# Patient Record
Sex: FEMALE | Race: BLACK OR AFRICAN AMERICAN | Employment: OTHER | URBAN - NONMETROPOLITAN AREA
[De-identification: names, ages, dates, MRNs, and addresses within clinical notes are randomized per-mention and may not be internally consistent; named-entity substitution may affect disease eponyms.]

---

## 2017-01-01 ENCOUNTER — APPOINTMENT (OUTPATIENT)
Dept: GENERAL RADIOLOGY | Age: 82
DRG: 870 | End: 2017-01-01
Payer: MEDICARE

## 2017-01-01 ENCOUNTER — HOSPITAL ENCOUNTER (INPATIENT)
Age: 82
LOS: 4 days | Discharge: HOSPICE | DRG: 870 | End: 2017-11-01
Attending: FAMILY MEDICINE | Admitting: INTERNAL MEDICINE
Payer: MEDICARE

## 2017-01-01 ENCOUNTER — APPOINTMENT (OUTPATIENT)
Dept: CT IMAGING | Age: 82
DRG: 870 | End: 2017-01-01
Payer: MEDICARE

## 2017-01-01 VITALS
DIASTOLIC BLOOD PRESSURE: 57 MMHG | BODY MASS INDEX: 23.32 KG/M2 | HEIGHT: 67 IN | TEMPERATURE: 98.8 F | HEART RATE: 86 BPM | OXYGEN SATURATION: 95 % | RESPIRATION RATE: 14 BRPM | SYSTOLIC BLOOD PRESSURE: 106 MMHG | WEIGHT: 148.6 LBS

## 2017-01-01 DIAGNOSIS — N17.9 ACUTE RENAL FAILURE, UNSPECIFIED ACUTE RENAL FAILURE TYPE (HCC): ICD-10-CM

## 2017-01-01 DIAGNOSIS — N39.0 BACTERIAL UTI: ICD-10-CM

## 2017-01-01 DIAGNOSIS — R77.8 ELEVATED TROPONIN: ICD-10-CM

## 2017-01-01 DIAGNOSIS — A49.9 BACTERIAL UTI: ICD-10-CM

## 2017-01-01 DIAGNOSIS — A41.9 SEPSIS, DUE TO UNSPECIFIED ORGANISM: Primary | ICD-10-CM

## 2017-01-01 DIAGNOSIS — J18.9 PNEUMONIA DUE TO ORGANISM: ICD-10-CM

## 2017-01-01 LAB
ALBUMIN SERPL-MCNC: 1.8 G/DL (ref 3.5–5.2)
ALBUMIN SERPL-MCNC: 3.5 G/DL (ref 3.5–5.2)
ALP BLD-CCNC: 106 U/L (ref 35–104)
ALP BLD-CCNC: 60 U/L (ref 35–104)
ALT SERPL-CCNC: 15 U/L (ref 5–33)
ALT SERPL-CCNC: 9 U/L (ref 5–33)
AMORPHOUS: ABNORMAL /HPF
ANION GAP SERPL CALCULATED.3IONS-SCNC: 11 MMOL/L (ref 7–19)
ANION GAP SERPL CALCULATED.3IONS-SCNC: 7 MMOL/L (ref 7–19)
ANION GAP SERPL CALCULATED.3IONS-SCNC: 8 MMOL/L (ref 7–19)
APTT: 31.8 SEC (ref 26–36.2)
AST SERPL-CCNC: 18 U/L (ref 5–32)
AST SERPL-CCNC: 22 U/L (ref 5–32)
BACTERIA: ABNORMAL /HPF
BASE EXCESS ARTERIAL: 10.4 MMOL/L (ref -2–2)
BASE EXCESS ARTERIAL: 2.4 MMOL/L (ref -2–2)
BASE EXCESS ARTERIAL: 3 MMOL/L (ref -2–2)
BASE EXCESS ARTERIAL: 3.5 MMOL/L (ref -2–2)
BASE EXCESS ARTERIAL: 5 MMOL/L (ref -2–2)
BASE EXCESS ARTERIAL: 7.7 MMOL/L (ref -2–2)
BASOPHILS ABSOLUTE: 0 K/UL (ref 0–0.2)
BASOPHILS RELATIVE PERCENT: 0.1 % (ref 0–1)
BASOPHILS RELATIVE PERCENT: 0.2 % (ref 0–1)
BASOPHILS RELATIVE PERCENT: 0.4 % (ref 0–1)
BILIRUB SERPL-MCNC: 0.4 MG/DL (ref 0.2–1.2)
BILIRUB SERPL-MCNC: 0.4 MG/DL (ref 0.2–1.2)
BILIRUBIN URINE: ABNORMAL
BLOOD CULTURE, ROUTINE: NORMAL
BLOOD, URINE: ABNORMAL
BUN BLDV-MCNC: 23 MG/DL (ref 8–23)
BUN BLDV-MCNC: 37 MG/DL (ref 8–23)
BUN BLDV-MCNC: 51 MG/DL (ref 8–23)
BUN BLDV-MCNC: 58 MG/DL (ref 8–23)
BUN BLDV-MCNC: 60 MG/DL (ref 8–23)
C-REACTIVE PROTEIN: 13.24 MG/DL (ref 0–0.5)
C-REACTIVE PROTEIN: 15.26 MG/DL (ref 0–0.5)
CALCIUM SERPL-MCNC: 7.6 MG/DL (ref 8.8–10.2)
CALCIUM SERPL-MCNC: 7.9 MG/DL (ref 8.8–10.2)
CALCIUM SERPL-MCNC: 8.2 MG/DL (ref 8.8–10.2)
CALCIUM SERPL-MCNC: 8.3 MG/DL (ref 8.8–10.2)
CALCIUM SERPL-MCNC: 8.7 MG/DL (ref 8.8–10.2)
CARBOXYHEMOGLOBIN ARTERIAL: 1.8 % (ref 0–5)
CARBOXYHEMOGLOBIN ARTERIAL: 2 % (ref 0–5)
CARBOXYHEMOGLOBIN ARTERIAL: 2.1 % (ref 0–5)
CARBOXYHEMOGLOBIN ARTERIAL: 2.2 % (ref 0–5)
CARBOXYHEMOGLOBIN ARTERIAL: 2.6 % (ref 0–5)
CARBOXYHEMOGLOBIN ARTERIAL: 2.7 % (ref 0–5)
CHLORIDE BLD-SCNC: 100 MMOL/L (ref 98–111)
CHLORIDE BLD-SCNC: 104 MMOL/L (ref 98–111)
CHLORIDE BLD-SCNC: 108 MMOL/L (ref 98–111)
CHLORIDE BLD-SCNC: 109 MMOL/L (ref 98–111)
CHLORIDE BLD-SCNC: 115 MMOL/L (ref 98–111)
CLARITY: ABNORMAL
CO2: 27 MMOL/L (ref 22–29)
CO2: 29 MMOL/L (ref 22–29)
CO2: 29 MMOL/L (ref 22–29)
CO2: 32 MMOL/L (ref 22–29)
CO2: 34 MMOL/L (ref 22–29)
COLOR: YELLOW
CREAT SERPL-MCNC: 1.2 MG/DL (ref 0.5–0.9)
CREAT SERPL-MCNC: 1.5 MG/DL (ref 0.5–0.9)
CREAT SERPL-MCNC: 1.8 MG/DL (ref 0.5–0.9)
CREAT SERPL-MCNC: 2.7 MG/DL (ref 0.5–0.9)
CREAT SERPL-MCNC: 3 MG/DL (ref 0.5–0.9)
CULTURE, BLOOD 2: NORMAL
EOSINOPHILS ABSOLUTE: 0 K/UL (ref 0–0.6)
EOSINOPHILS ABSOLUTE: 0.1 K/UL (ref 0–0.6)
EOSINOPHILS ABSOLUTE: 0.1 K/UL (ref 0–0.6)
EOSINOPHILS RELATIVE PERCENT: 0 % (ref 0–5)
EOSINOPHILS RELATIVE PERCENT: 0.5 % (ref 0–5)
EOSINOPHILS RELATIVE PERCENT: 1.1 % (ref 0–5)
EPITHELIAL CELLS, UA: ABNORMAL /HPF
GFR NON-AFRICAN AMERICAN: 15
GFR NON-AFRICAN AMERICAN: 17
GFR NON-AFRICAN AMERICAN: 27
GFR NON-AFRICAN AMERICAN: 33
GFR NON-AFRICAN AMERICAN: 43
GLUCOSE BLD-MCNC: 105 MG/DL (ref 74–109)
GLUCOSE BLD-MCNC: 108 MG/DL (ref 74–109)
GLUCOSE BLD-MCNC: 121 MG/DL (ref 74–109)
GLUCOSE BLD-MCNC: 174 MG/DL
GLUCOSE BLD-MCNC: 174 MG/DL (ref 70–99)
GLUCOSE BLD-MCNC: 231 MG/DL (ref 74–109)
GLUCOSE BLD-MCNC: 85 MG/DL (ref 74–109)
GLUCOSE URINE: NEGATIVE MG/DL
HCO3 ARTERIAL: 27 MMOL/L (ref 22–26)
HCO3 ARTERIAL: 27.8 MMOL/L (ref 22–26)
HCO3 ARTERIAL: 27.8 MMOL/L (ref 22–26)
HCO3 ARTERIAL: 30.9 MMOL/L (ref 22–26)
HCO3 ARTERIAL: 32.1 MMOL/L (ref 22–26)
HCO3 ARTERIAL: 34.6 MMOL/L (ref 22–26)
HCT VFR BLD CALC: 31.7 % (ref 37–47)
HCT VFR BLD CALC: 32.9 % (ref 37–47)
HCT VFR BLD CALC: 33.6 % (ref 37–47)
HCT VFR BLD CALC: 48.4 % (ref 37–47)
HEMOGLOBIN, ART, EXTENDED: 10.6 G/DL (ref 12–16)
HEMOGLOBIN, ART, EXTENDED: 10.8 G/DL (ref 12–16)
HEMOGLOBIN, ART, EXTENDED: 11.2 G/DL (ref 12–16)
HEMOGLOBIN, ART, EXTENDED: 11.5 G/DL (ref 12–16)
HEMOGLOBIN, ART, EXTENDED: 12.1 G/DL (ref 12–16)
HEMOGLOBIN, ART, EXTENDED: 12.9 G/DL (ref 12–16)
HEMOGLOBIN: 10.1 G/DL (ref 12–16)
HEMOGLOBIN: 10.4 G/DL (ref 12–16)
HEMOGLOBIN: 14 G/DL (ref 12–16)
HEMOGLOBIN: 9.8 G/DL (ref 12–16)
INR BLD: 1.37 (ref 0.88–1.18)
KETONES, URINE: NEGATIVE MG/DL
LACTIC ACID: 3.4 MMOL/L (ref 0.5–1.9)
LEUKOCYTE ESTERASE, URINE: ABNORMAL
LYMPHOCYTES ABSOLUTE: 0.5 K/UL (ref 1.1–4.5)
LYMPHOCYTES ABSOLUTE: 0.7 K/UL (ref 1.1–4.5)
LYMPHOCYTES ABSOLUTE: 0.8 K/UL (ref 1.1–4.5)
LYMPHOCYTES RELATIVE PERCENT: 5.5 % (ref 20–40)
LYMPHOCYTES RELATIVE PERCENT: 5.6 % (ref 20–40)
LYMPHOCYTES RELATIVE PERCENT: 6.6 % (ref 20–40)
MCH RBC QN AUTO: 26 PG (ref 27–31)
MCH RBC QN AUTO: 26.2 PG (ref 27–31)
MCH RBC QN AUTO: 26.3 PG (ref 27–31)
MCH RBC QN AUTO: 26.8 PG (ref 27–31)
MCHC RBC AUTO-ENTMCNC: 28.9 G/DL (ref 33–37)
MCHC RBC AUTO-ENTMCNC: 30.7 G/DL (ref 33–37)
MCHC RBC AUTO-ENTMCNC: 30.9 G/DL (ref 33–37)
MCHC RBC AUTO-ENTMCNC: 31 G/DL (ref 33–37)
MCV RBC AUTO: 85.1 FL (ref 81–99)
MCV RBC AUTO: 85.5 FL (ref 81–99)
MCV RBC AUTO: 86.8 FL (ref 81–99)
MCV RBC AUTO: 90 FL (ref 81–99)
METHEMOGLOBIN ARTERIAL: 0.8 %
METHEMOGLOBIN ARTERIAL: 0.9 %
METHEMOGLOBIN ARTERIAL: 1 %
METHEMOGLOBIN ARTERIAL: 1.3 %
METHEMOGLOBIN ARTERIAL: 1.4 %
METHEMOGLOBIN ARTERIAL: 1.5 %
MONOCYTES ABSOLUTE: 0.8 K/UL (ref 0–0.9)
MONOCYTES ABSOLUTE: 0.9 K/UL (ref 0–0.9)
MONOCYTES ABSOLUTE: 0.9 K/UL (ref 0–0.9)
MONOCYTES RELATIVE PERCENT: 10.4 % (ref 0–10)
MONOCYTES RELATIVE PERCENT: 5.8 % (ref 0–10)
MONOCYTES RELATIVE PERCENT: 8.7 % (ref 0–10)
NEUTROPHILS ABSOLUTE: 11.9 K/UL (ref 1.5–7.5)
NEUTROPHILS ABSOLUTE: 6.9 K/UL (ref 1.5–7.5)
NEUTROPHILS ABSOLUTE: 8.3 K/UL (ref 1.5–7.5)
NEUTROPHILS RELATIVE PERCENT: 81.8 % (ref 50–65)
NEUTROPHILS RELATIVE PERCENT: 83.5 % (ref 50–65)
NEUTROPHILS RELATIVE PERCENT: 87.9 % (ref 50–65)
NITRITE, URINE: NEGATIVE
O2 CONTENT ARTERIAL: 13.9 ML/DL
O2 CONTENT ARTERIAL: 14.1 ML/DL
O2 CONTENT ARTERIAL: 15.2 ML/DL
O2 CONTENT ARTERIAL: 15.3 ML/DL
O2 CONTENT ARTERIAL: 16.5 ML/DL
O2 CONTENT ARTERIAL: 16.7 ML/DL
O2 SAT, ARTERIAL: 91.8 %
O2 SAT, ARTERIAL: 92.4 %
O2 SAT, ARTERIAL: 92.9 %
O2 SAT, ARTERIAL: 93.5 %
O2 SAT, ARTERIAL: 96 %
O2 SAT, ARTERIAL: 96.2 %
O2 THERAPY: ABNORMAL
ORGANISM: ABNORMAL
PCO2 ARTERIAL: 32 MMHG (ref 35–45)
PCO2 ARTERIAL: 37 MMHG (ref 35–45)
PCO2 ARTERIAL: 38 MMHG (ref 35–45)
PCO2 ARTERIAL: 40 MMHG (ref 35–45)
PCO2 ARTERIAL: 46 MMHG (ref 35–45)
PCO2 ARTERIAL: 77 MMHG (ref 35–45)
PDW BLD-RTO: 15.9 % (ref 11.5–14.5)
PDW BLD-RTO: 15.9 % (ref 11.5–14.5)
PDW BLD-RTO: 16.3 % (ref 11.5–14.5)
PDW BLD-RTO: 16.3 % (ref 11.5–14.5)
PERFORMED ON: ABNORMAL
PERFORMED ON: ABNORMAL
PH ARTERIAL: 7.26 (ref 7.35–7.45)
PH ARTERIAL: 7.39 (ref 7.35–7.45)
PH ARTERIAL: 7.45 (ref 7.35–7.45)
PH ARTERIAL: 7.46 (ref 7.35–7.45)
PH ARTERIAL: 7.53 (ref 7.35–7.45)
PH ARTERIAL: 7.61 (ref 7.35–7.45)
PH UA: 5
PLATELET # BLD: 177 K/UL (ref 130–400)
PLATELET # BLD: 193 K/UL (ref 130–400)
PLATELET # BLD: 204 K/UL (ref 130–400)
PLATELET # BLD: 221 K/UL (ref 130–400)
PMV BLD AUTO: 9 FL (ref 9.4–12.3)
PMV BLD AUTO: 9.6 FL (ref 9.4–12.3)
PMV BLD AUTO: 9.9 FL (ref 9.4–12.3)
PMV BLD AUTO: 9.9 FL (ref 9.4–12.3)
PO2 ARTERIAL: 102 MMHG (ref 80–100)
PO2 ARTERIAL: 108 MMHG (ref 80–100)
PO2 ARTERIAL: 61 MMHG (ref 80–100)
PO2 ARTERIAL: 67 MMHG (ref 80–100)
PO2 ARTERIAL: 70 MMHG (ref 80–100)
PO2 ARTERIAL: 73 MMHG (ref 80–100)
POC TROPONIN I: 0.17 NG/ML (ref 0–0.08)
POTASSIUM SERPL-SCNC: 3.7 MMOL/L (ref 3.5–5)
POTASSIUM SERPL-SCNC: 3.7 MMOL/L (ref 3.5–5)
POTASSIUM SERPL-SCNC: 3.9 MMOL/L (ref 3.5–5)
POTASSIUM SERPL-SCNC: 4.3 MMOL/L (ref 3.5–5)
POTASSIUM SERPL-SCNC: 5 MMOL/L (ref 3.5–5)
POTASSIUM, WHOLE BLOOD: 3.5
POTASSIUM, WHOLE BLOOD: 3.6
POTASSIUM, WHOLE BLOOD: 3.8
POTASSIUM, WHOLE BLOOD: 4.1
POTASSIUM, WHOLE BLOOD: 4.2
POTASSIUM, WHOLE BLOOD: 4.5
PROTEIN UA: 100 MG/DL
PROTHROMBIN TIME: 16.9 SEC (ref 12–14.6)
RBC # BLD: 3.65 M/UL (ref 4.2–5.4)
RBC # BLD: 3.85 M/UL (ref 4.2–5.4)
RBC # BLD: 3.95 M/UL (ref 4.2–5.4)
RBC # BLD: 5.38 M/UL (ref 4.2–5.4)
RBC UA: ABNORMAL /HPF (ref 0–2)
SODIUM BLD-SCNC: 141 MMOL/L (ref 136–145)
SODIUM BLD-SCNC: 145 MMOL/L (ref 136–145)
SODIUM BLD-SCNC: 145 MMOL/L (ref 136–145)
SODIUM BLD-SCNC: 148 MMOL/L (ref 136–145)
SODIUM BLD-SCNC: 150 MMOL/L (ref 136–145)
SPECIFIC GRAVITY UA: >=1.03
TOTAL CK: 203 U/L (ref 26–192)
TOTAL PROTEIN: 5.4 G/DL (ref 6.6–8.7)
TOTAL PROTEIN: 8.3 G/DL (ref 6.6–8.7)
URINE CULTURE, ROUTINE: ABNORMAL
URINE CULTURE, ROUTINE: ABNORMAL
UROBILINOGEN, URINE: 1 E.U./DL
VANCOMYCIN RANDOM: 13.4 UG/ML
VANCOMYCIN RANDOM: 5.2 UG/ML
VANCOMYCIN TROUGH: 11.2 UG/ML (ref 10–20)
WBC # BLD: 12.8 K/UL (ref 4.8–10.8)
WBC # BLD: 13.6 K/UL (ref 4.8–10.8)
WBC # BLD: 8.4 K/UL (ref 4.8–10.8)
WBC # BLD: 9.9 K/UL (ref 4.8–10.8)
WBC UA: ABNORMAL /HPF (ref 0–5)

## 2017-01-01 PROCEDURE — 2500000003 HC RX 250 WO HCPCS: Performed by: FAMILY MEDICINE

## 2017-01-01 PROCEDURE — 6360000002 HC RX W HCPCS: Performed by: INTERNAL MEDICINE

## 2017-01-01 PROCEDURE — 80202 ASSAY OF VANCOMYCIN: CPT

## 2017-01-01 PROCEDURE — 2580000003 HC RX 258: Performed by: INTERNAL MEDICINE

## 2017-01-01 PROCEDURE — 2580000003 HC RX 258: Performed by: FAMILY MEDICINE

## 2017-01-01 PROCEDURE — 2700000000 HC OXYGEN THERAPY PER DAY

## 2017-01-01 PROCEDURE — 6370000000 HC RX 637 (ALT 250 FOR IP): Performed by: INTERNAL MEDICINE

## 2017-01-01 PROCEDURE — C9113 INJ PANTOPRAZOLE SODIUM, VIA: HCPCS | Performed by: INTERNAL MEDICINE

## 2017-01-01 PROCEDURE — 99232 SBSQ HOSP IP/OBS MODERATE 35: CPT | Performed by: INTERNAL MEDICINE

## 2017-01-01 PROCEDURE — 83605 ASSAY OF LACTIC ACID: CPT

## 2017-01-01 PROCEDURE — 36556 INSERT NON-TUNNEL CV CATH: CPT | Performed by: FAMILY MEDICINE

## 2017-01-01 PROCEDURE — 99223 1ST HOSP IP/OBS HIGH 75: CPT | Performed by: INTERNAL MEDICINE

## 2017-01-01 PROCEDURE — 80053 COMPREHEN METABOLIC PANEL: CPT

## 2017-01-01 PROCEDURE — 70450 CT HEAD/BRAIN W/O DYE: CPT

## 2017-01-01 PROCEDURE — 82803 BLOOD GASES ANY COMBINATION: CPT

## 2017-01-01 PROCEDURE — 0BH17EZ INSERTION OF ENDOTRACHEAL AIRWAY INTO TRACHEA, VIA NATURAL OR ARTIFICIAL OPENING: ICD-10-PCS | Performed by: FAMILY MEDICINE

## 2017-01-01 PROCEDURE — 94002 VENT MGMT INPAT INIT DAY: CPT

## 2017-01-01 PROCEDURE — 71010 XR CHEST PORTABLE: CPT

## 2017-01-01 PROCEDURE — 2000000000 HC ICU R&B

## 2017-01-01 PROCEDURE — 84132 ASSAY OF SERUM POTASSIUM: CPT

## 2017-01-01 PROCEDURE — 36415 COLL VENOUS BLD VENIPUNCTURE: CPT

## 2017-01-01 PROCEDURE — 82550 ASSAY OF CK (CPK): CPT

## 2017-01-01 PROCEDURE — 36600 WITHDRAWAL OF ARTERIAL BLOOD: CPT

## 2017-01-01 PROCEDURE — 86140 C-REACTIVE PROTEIN: CPT

## 2017-01-01 PROCEDURE — 81001 URINALYSIS AUTO W/SCOPE: CPT

## 2017-01-01 PROCEDURE — 87040 BLOOD CULTURE FOR BACTERIA: CPT

## 2017-01-01 PROCEDURE — 87185 SC STD ENZYME DETCJ PER NZM: CPT

## 2017-01-01 PROCEDURE — 80048 BASIC METABOLIC PNL TOTAL CA: CPT

## 2017-01-01 PROCEDURE — 87086 URINE CULTURE/COLONY COUNT: CPT

## 2017-01-01 PROCEDURE — 99291 CRITICAL CARE FIRST HOUR: CPT | Performed by: INTERNAL MEDICINE

## 2017-01-01 PROCEDURE — 2580000003 HC RX 258: Performed by: PHYSICIAN ASSISTANT

## 2017-01-01 PROCEDURE — 93005 ELECTROCARDIOGRAM TRACING: CPT

## 2017-01-01 PROCEDURE — 85730 THROMBOPLASTIN TIME PARTIAL: CPT

## 2017-01-01 PROCEDURE — B548ZZA ULTRASONOGRAPHY OF SUPERIOR VENA CAVA, GUIDANCE: ICD-10-PCS | Performed by: FAMILY MEDICINE

## 2017-01-01 PROCEDURE — 02HV33Z INSERTION OF INFUSION DEVICE INTO SUPERIOR VENA CAVA, PERCUTANEOUS APPROACH: ICD-10-PCS | Performed by: FAMILY MEDICINE

## 2017-01-01 PROCEDURE — 5A1955Z RESPIRATORY VENTILATION, GREATER THAN 96 CONSECUTIVE HOURS: ICD-10-PCS | Performed by: INTERNAL MEDICINE

## 2017-01-01 PROCEDURE — 76937 US GUIDE VASCULAR ACCESS: CPT | Performed by: FAMILY MEDICINE

## 2017-01-01 PROCEDURE — 94003 VENT MGMT INPAT SUBQ DAY: CPT

## 2017-01-01 PROCEDURE — 85025 COMPLETE CBC W/AUTO DIFF WBC: CPT

## 2017-01-01 PROCEDURE — 92950 HEART/LUNG RESUSCITATION CPR: CPT

## 2017-01-01 PROCEDURE — 84484 ASSAY OF TROPONIN QUANT: CPT

## 2017-01-01 PROCEDURE — 85027 COMPLETE CBC AUTOMATED: CPT

## 2017-01-01 PROCEDURE — 31500 INSERT EMERGENCY AIRWAY: CPT | Performed by: FAMILY MEDICINE

## 2017-01-01 PROCEDURE — 36592 COLLECT BLOOD FROM PICC: CPT

## 2017-01-01 PROCEDURE — 96374 THER/PROPH/DIAG INJ IV PUSH: CPT

## 2017-01-01 PROCEDURE — 31500 INSERT EMERGENCY AIRWAY: CPT

## 2017-01-01 PROCEDURE — 99285 EMERGENCY DEPT VISIT HI MDM: CPT

## 2017-01-01 PROCEDURE — 82948 REAGENT STRIP/BLOOD GLUCOSE: CPT

## 2017-01-01 PROCEDURE — 85610 PROTHROMBIN TIME: CPT

## 2017-01-01 PROCEDURE — 99291 CRITICAL CARE FIRST HOUR: CPT | Performed by: FAMILY MEDICINE

## 2017-01-01 RX ORDER — FENTANYL CITRATE 50 UG/ML
25 INJECTION, SOLUTION INTRAMUSCULAR; INTRAVENOUS EVERY 4 HOURS PRN
Qty: 2 ML | Refills: 0 | Status: ON HOLD | OUTPATIENT
Start: 2017-01-01 | End: 2017-01-01 | Stop reason: ALTCHOICE

## 2017-01-01 RX ORDER — ATORVASTATIN CALCIUM 20 MG/1
20 TABLET, FILM COATED ORAL NIGHTLY
Status: ON HOLD | COMMUNITY
End: 2017-01-01 | Stop reason: HOSPADM

## 2017-01-01 RX ORDER — SODIUM CHLORIDE 9 MG/ML
INJECTION, SOLUTION INTRAVENOUS CONTINUOUS
Status: DISCONTINUED | OUTPATIENT
Start: 2017-01-01 | End: 2017-01-01

## 2017-01-01 RX ORDER — TRAMADOL HYDROCHLORIDE 50 MG/1
50 TABLET ORAL EVERY 6 HOURS PRN
Status: ON HOLD | COMMUNITY
End: 2017-01-01 | Stop reason: HOSPADM

## 2017-01-01 RX ORDER — LISINOPRIL 20 MG/1
20 TABLET ORAL DAILY
Status: ON HOLD | COMMUNITY
End: 2017-01-01 | Stop reason: HOSPADM

## 2017-01-01 RX ORDER — GUAIFENESIN AND CODEINE PHOSPHATE 100; 10 MG/5ML; MG/5ML
5 SOLUTION ORAL EVERY 4 HOURS PRN
Status: ON HOLD | COMMUNITY
End: 2017-01-01 | Stop reason: HOSPADM

## 2017-01-01 RX ORDER — MEGESTROL ACETATE 40 MG/1
40 TABLET ORAL DAILY
Status: ON HOLD | COMMUNITY
End: 2017-01-01 | Stop reason: HOSPADM

## 2017-01-01 RX ORDER — MEMANTINE HYDROCHLORIDE 28 MG/1
28 CAPSULE, EXTENDED RELEASE ORAL DAILY
Status: ON HOLD | COMMUNITY
End: 2017-01-01 | Stop reason: HOSPADM

## 2017-01-01 RX ORDER — VANCOMYCIN HYDROCHLORIDE 1 G/200ML
1000 INJECTION, SOLUTION INTRAVENOUS ONCE
Status: COMPLETED | OUTPATIENT
Start: 2017-01-01 | End: 2017-01-01

## 2017-01-01 RX ORDER — METOPROLOL SUCCINATE 25 MG/1
25 TABLET, EXTENDED RELEASE ORAL 2 TIMES DAILY
Status: ON HOLD | COMMUNITY
End: 2017-01-01 | Stop reason: HOSPADM

## 2017-01-01 RX ORDER — HEPARIN SODIUM 5000 [USP'U]/ML
5000 INJECTION, SOLUTION INTRAVENOUS; SUBCUTANEOUS EVERY 8 HOURS SCHEDULED
Status: DISCONTINUED | OUTPATIENT
Start: 2017-01-01 | End: 2017-01-01 | Stop reason: HOSPADM

## 2017-01-01 RX ORDER — LORAZEPAM 2 MG/ML
1 INJECTION INTRAMUSCULAR EVERY 4 HOURS PRN
Status: DISCONTINUED | OUTPATIENT
Start: 2017-01-01 | End: 2017-01-01 | Stop reason: HOSPADM

## 2017-01-01 RX ORDER — LORAZEPAM 2 MG/ML
1 INJECTION INTRAMUSCULAR EVERY 4 HOURS PRN
Qty: 10 ML | Refills: 0 | Status: ON HOLD | OUTPATIENT
Start: 2017-01-01 | End: 2017-01-01 | Stop reason: ALTCHOICE

## 2017-01-01 RX ORDER — TRAZODONE HYDROCHLORIDE 100 MG/1
100 TABLET ORAL NIGHTLY
Status: ON HOLD | COMMUNITY
End: 2017-01-01 | Stop reason: HOSPADM

## 2017-01-01 RX ORDER — FENTANYL CITRATE 50 UG/ML
25 INJECTION, SOLUTION INTRAMUSCULAR; INTRAVENOUS EVERY 4 HOURS PRN
Status: DISCONTINUED | OUTPATIENT
Start: 2017-01-01 | End: 2017-01-01 | Stop reason: HOSPADM

## 2017-01-01 RX ORDER — QUETIAPINE FUMARATE 200 MG/1
200 TABLET, FILM COATED ORAL NIGHTLY
Status: ON HOLD | COMMUNITY
End: 2017-01-01 | Stop reason: HOSPADM

## 2017-01-01 RX ORDER — 0.9 % SODIUM CHLORIDE 0.9 %
1000 INTRAVENOUS SOLUTION INTRAVENOUS ONCE
Status: COMPLETED | OUTPATIENT
Start: 2017-01-01 | End: 2017-01-01

## 2017-01-01 RX ORDER — SODIUM CHLORIDE 9 MG/ML
INJECTION, SOLUTION INTRAVENOUS CONTINUOUS
Status: DISCONTINUED | OUTPATIENT
Start: 2017-01-01 | End: 2017-01-01 | Stop reason: HOSPADM

## 2017-01-01 RX ORDER — CHLORHEXIDINE GLUCONATE 0.12 MG/ML
15 RINSE ORAL 2 TIMES DAILY
Status: DISCONTINUED | OUTPATIENT
Start: 2017-01-01 | End: 2017-01-01 | Stop reason: HOSPADM

## 2017-01-01 RX ORDER — VANCOMYCIN HYDROCHLORIDE 1 G/200ML
1000 INJECTION, SOLUTION INTRAVENOUS EVERY 12 HOURS
Status: DISCONTINUED | OUTPATIENT
Start: 2017-01-01 | End: 2017-01-01 | Stop reason: DRUGHIGH

## 2017-01-01 RX ORDER — 0.9 % SODIUM CHLORIDE 0.9 %
500 INTRAVENOUS SOLUTION INTRAVENOUS ONCE
Status: COMPLETED | OUTPATIENT
Start: 2017-01-01 | End: 2017-01-01

## 2017-01-01 RX ORDER — VANCOMYCIN HYDROCHLORIDE 1 G/200ML
1000 INJECTION, SOLUTION INTRAVENOUS EVERY 24 HOURS
Status: DISCONTINUED | OUTPATIENT
Start: 2017-01-01 | End: 2017-01-01 | Stop reason: HOSPADM

## 2017-01-01 RX ORDER — SENNA PLUS 8.6 MG/1
1 TABLET ORAL DAILY PRN
Status: ON HOLD | COMMUNITY
End: 2017-01-01 | Stop reason: HOSPADM

## 2017-01-01 RX ORDER — DONEPEZIL HYDROCHLORIDE 10 MG/1
10 TABLET, FILM COATED ORAL NIGHTLY
Status: ON HOLD | COMMUNITY
End: 2017-01-01 | Stop reason: HOSPADM

## 2017-01-01 RX ORDER — BUSPIRONE HYDROCHLORIDE 10 MG/1
20 TABLET ORAL 2 TIMES DAILY
Status: ON HOLD | COMMUNITY
End: 2017-01-01 | Stop reason: HOSPADM

## 2017-01-01 RX ORDER — MEROPENEM 500 MG/1
0.5 INJECTION, POWDER, FOR SOLUTION INTRAVENOUS EVERY 12 HOURS
Status: DISCONTINUED | OUTPATIENT
Start: 2017-01-01 | End: 2017-01-01 | Stop reason: SDUPTHER

## 2017-01-01 RX ORDER — 0.9 % SODIUM CHLORIDE 0.9 %
10 VIAL (ML) INJECTION DAILY
Status: DISCONTINUED | OUTPATIENT
Start: 2017-01-01 | End: 2017-01-01 | Stop reason: HOSPADM

## 2017-01-01 RX ORDER — DEXTROSE MONOHYDRATE 50 MG/ML
INJECTION, SOLUTION INTRAVENOUS CONTINUOUS
Status: DISCONTINUED | OUTPATIENT
Start: 2017-01-01 | End: 2017-01-01

## 2017-01-01 RX ORDER — ALBUTEROL SULFATE 90 UG/1
2 AEROSOL, METERED RESPIRATORY (INHALATION) EVERY 6 HOURS PRN
Qty: 1 INHALER | Refills: 3 | Status: ON HOLD | OUTPATIENT
Start: 2017-01-01 | End: 2017-01-01 | Stop reason: ALTCHOICE

## 2017-01-01 RX ORDER — ALBUTEROL SULFATE 90 UG/1
2 AEROSOL, METERED RESPIRATORY (INHALATION) EVERY 6 HOURS PRN
Status: DISCONTINUED | OUTPATIENT
Start: 2017-01-01 | End: 2017-01-01 | Stop reason: HOSPADM

## 2017-01-01 RX ORDER — PROPOFOL 10 MG/ML
10 INJECTION, EMULSION INTRAVENOUS CONTINUOUS
Status: DISCONTINUED | OUTPATIENT
Start: 2017-01-01 | End: 2017-01-01 | Stop reason: HOSPADM

## 2017-01-01 RX ORDER — PANTOPRAZOLE SODIUM 40 MG/10ML
40 INJECTION, POWDER, LYOPHILIZED, FOR SOLUTION INTRAVENOUS DAILY
Status: DISCONTINUED | OUTPATIENT
Start: 2017-01-01 | End: 2017-01-01 | Stop reason: HOSPADM

## 2017-01-01 RX ORDER — CLINDAMYCIN PHOSPHATE 300 MG/50ML
300 INJECTION INTRAVENOUS ONCE
Status: COMPLETED | OUTPATIENT
Start: 2017-01-01 | End: 2017-01-01

## 2017-01-01 RX ADMIN — PROPOFOL 40 MCG/KG/MIN: 10 INJECTION, EMULSION INTRAVENOUS at 02:53

## 2017-01-01 RX ADMIN — DEXTROSE MONOHYDRATE: 50 INJECTION, SOLUTION INTRAVENOUS at 18:19

## 2017-01-01 RX ADMIN — Medication 10 ML: at 08:22

## 2017-01-01 RX ADMIN — VANCOMYCIN HYDROCHLORIDE 1000 MG: 1 INJECTION, SOLUTION INTRAVENOUS at 05:20

## 2017-01-01 RX ADMIN — HEPARIN SODIUM 5000 UNITS: 5000 INJECTION INTRAVENOUS; SUBCUTANEOUS at 14:30

## 2017-01-01 RX ADMIN — SODIUM CHLORIDE: 9 INJECTION, SOLUTION INTRAVENOUS at 09:13

## 2017-01-01 RX ADMIN — MEROPENEM 0.5 G: 500 INJECTION, POWDER, FOR SOLUTION INTRAVENOUS at 20:33

## 2017-01-01 RX ADMIN — MEROPENEM 500 MG: 500 INJECTION, POWDER, FOR SOLUTION INTRAVENOUS at 18:16

## 2017-01-01 RX ADMIN — CHLORHEXIDINE GLUCONATE 15 ML: 1.2 RINSE ORAL at 09:13

## 2017-01-01 RX ADMIN — WATER 10 ML: 1 INJECTION INTRAMUSCULAR; INTRAVENOUS; SUBCUTANEOUS at 05:53

## 2017-01-01 RX ADMIN — HEPARIN SODIUM 5000 UNITS: 5000 INJECTION INTRAVENOUS; SUBCUTANEOUS at 22:29

## 2017-01-01 RX ADMIN — NOREPINEPHRINE BITARTRATE 2 MCG/MIN: 1 INJECTION, SOLUTION, CONCENTRATE INTRAVENOUS at 14:35

## 2017-01-01 RX ADMIN — PANTOPRAZOLE SODIUM 40 MG: 40 INJECTION, POWDER, FOR SOLUTION INTRAVENOUS at 07:59

## 2017-01-01 RX ADMIN — VANCOMYCIN HYDROCHLORIDE 1000 MG: 1 INJECTION, SOLUTION INTRAVENOUS at 04:49

## 2017-01-01 RX ADMIN — SODIUM CHLORIDE: 9 INJECTION, SOLUTION INTRAVENOUS at 08:24

## 2017-01-01 RX ADMIN — CHLORHEXIDINE GLUCONATE 15 ML: 1.2 RINSE ORAL at 21:00

## 2017-01-01 RX ADMIN — Medication 10 ML: at 07:59

## 2017-01-01 RX ADMIN — SODIUM CHLORIDE: 9 INJECTION, SOLUTION INTRAVENOUS at 05:21

## 2017-01-01 RX ADMIN — Medication 10 ML: at 08:24

## 2017-01-01 RX ADMIN — CHLORHEXIDINE GLUCONATE 15 ML: 1.2 RINSE ORAL at 08:00

## 2017-01-01 RX ADMIN — PANTOPRAZOLE SODIUM 40 MG: 40 INJECTION, POWDER, FOR SOLUTION INTRAVENOUS at 08:23

## 2017-01-01 RX ADMIN — PROPOFOL 20 MCG/KG/MIN: 10 INJECTION, EMULSION INTRAVENOUS at 17:58

## 2017-01-01 RX ADMIN — WATER 10 ML: 1 INJECTION INTRAMUSCULAR; INTRAVENOUS; SUBCUTANEOUS at 17:58

## 2017-01-01 RX ADMIN — LORAZEPAM 1 MG: 2 INJECTION INTRAMUSCULAR; INTRAVENOUS at 04:49

## 2017-01-01 RX ADMIN — PROPOFOL 30 MCG/KG/MIN: 10 INJECTION, EMULSION INTRAVENOUS at 20:05

## 2017-01-01 RX ADMIN — VANCOMYCIN HYDROCHLORIDE 750 MG: 1 INJECTION, POWDER, LYOPHILIZED, FOR SOLUTION INTRAVENOUS at 22:10

## 2017-01-01 RX ADMIN — DEXTROSE MONOHYDRATE: 50 INJECTION, SOLUTION INTRAVENOUS at 08:32

## 2017-01-01 RX ADMIN — HEPARIN SODIUM 5000 UNITS: 5000 INJECTION INTRAVENOUS; SUBCUTANEOUS at 05:55

## 2017-01-01 RX ADMIN — PANTOPRAZOLE SODIUM 40 MG: 40 INJECTION, POWDER, FOR SOLUTION INTRAVENOUS at 08:22

## 2017-01-01 RX ADMIN — PROPOFOL 5 MCG/KG/MIN: 10 INJECTION, EMULSION INTRAVENOUS at 20:04

## 2017-01-01 RX ADMIN — CHLORHEXIDINE GLUCONATE 15 ML: 1.2 RINSE ORAL at 20:29

## 2017-01-01 RX ADMIN — Medication 10 ML: at 09:13

## 2017-01-01 RX ADMIN — Medication 50 ML: at 20:32

## 2017-01-01 RX ADMIN — CHLORHEXIDINE GLUCONATE 15 ML: 1.2 RINSE ORAL at 08:23

## 2017-01-01 RX ADMIN — SODIUM CHLORIDE 1000 ML: 9 INJECTION, SOLUTION INTRAVENOUS at 12:22

## 2017-01-01 RX ADMIN — MEROPENEM 500 MG: 500 INJECTION, POWDER, FOR SOLUTION INTRAVENOUS at 17:25

## 2017-01-01 RX ADMIN — DEXTROSE MONOHYDRATE: 50 INJECTION, SOLUTION INTRAVENOUS at 04:55

## 2017-01-01 RX ADMIN — PROPOFOL 30 MCG/KG/MIN: 10 INJECTION, EMULSION INTRAVENOUS at 07:20

## 2017-01-01 RX ADMIN — MEROPENEM 0.5 G: 500 INJECTION, POWDER, FOR SOLUTION INTRAVENOUS at 05:51

## 2017-01-01 RX ADMIN — WATER 10 ML: 1 INJECTION INTRAMUSCULAR; INTRAVENOUS; SUBCUTANEOUS at 05:20

## 2017-01-01 RX ADMIN — PANTOPRAZOLE SODIUM 40 MG: 40 INJECTION, POWDER, FOR SOLUTION INTRAVENOUS at 20:50

## 2017-01-01 RX ADMIN — SODIUM CHLORIDE 500 ML: 9 INJECTION, SOLUTION INTRAVENOUS at 23:31

## 2017-01-01 RX ADMIN — PROPOFOL 20 MCG/KG/MIN: 10 INJECTION, EMULSION INTRAVENOUS at 08:23

## 2017-01-01 RX ADMIN — HEPARIN SODIUM 5000 UNITS: 5000 INJECTION INTRAVENOUS; SUBCUTANEOUS at 05:21

## 2017-01-01 RX ADMIN — PROPOFOL 30 MCG/KG/MIN: 10 INJECTION, EMULSION INTRAVENOUS at 08:12

## 2017-01-01 RX ADMIN — SODIUM CHLORIDE 100 ML/HR: 9 INJECTION, SOLUTION INTRAVENOUS at 16:25

## 2017-01-01 RX ADMIN — CHLORHEXIDINE GLUCONATE 15 ML: 1.2 RINSE ORAL at 20:06

## 2017-01-01 RX ADMIN — LORAZEPAM 1 MG: 2 INJECTION INTRAMUSCULAR; INTRAVENOUS at 14:33

## 2017-01-01 RX ADMIN — PANTOPRAZOLE SODIUM 40 MG: 40 INJECTION, POWDER, FOR SOLUTION INTRAVENOUS at 09:13

## 2017-01-01 RX ADMIN — SODIUM CHLORIDE: 9 INJECTION, SOLUTION INTRAVENOUS at 18:52

## 2017-01-01 RX ADMIN — MEROPENEM 0.5 G: 500 INJECTION, POWDER, FOR SOLUTION INTRAVENOUS at 17:57

## 2017-01-01 RX ADMIN — HEPARIN SODIUM 5000 UNITS: 5000 INJECTION INTRAVENOUS; SUBCUTANEOUS at 05:30

## 2017-01-01 RX ADMIN — PROPOFOL 30 MCG/KG/MIN: 10 INJECTION, EMULSION INTRAVENOUS at 14:45

## 2017-01-01 RX ADMIN — MEROPENEM 500 MG: 500 INJECTION, POWDER, FOR SOLUTION INTRAVENOUS at 05:53

## 2017-01-01 RX ADMIN — VANCOMYCIN HYDROCHLORIDE 1000 MG: 1 INJECTION, SOLUTION INTRAVENOUS at 00:39

## 2017-01-01 RX ADMIN — MEROPENEM 500 MG: 500 INJECTION, POWDER, FOR SOLUTION INTRAVENOUS at 05:30

## 2017-01-01 RX ADMIN — MEROPENEM 0.5 G: 500 INJECTION, POWDER, FOR SOLUTION INTRAVENOUS at 05:20

## 2017-01-01 RX ADMIN — NOREPINEPHRINE BITARTRATE 5 MCG/MIN: 1 INJECTION, SOLUTION, CONCENTRATE INTRAVENOUS at 16:25

## 2017-01-01 RX ADMIN — CLINDAMYCIN PHOSPHATE 300 MG: 300 INJECTION INTRAVENOUS at 13:33

## 2017-01-01 RX ADMIN — HEPARIN SODIUM 5000 UNITS: 5000 INJECTION INTRAVENOUS; SUBCUTANEOUS at 22:37

## 2017-01-01 RX ADMIN — CHLORHEXIDINE GLUCONATE 15 ML: 1.2 RINSE ORAL at 08:24

## 2017-01-01 RX ADMIN — HEPARIN SODIUM 5000 UNITS: 5000 INJECTION INTRAVENOUS; SUBCUTANEOUS at 21:01

## 2017-01-01 RX ADMIN — DEXTROSE MONOHYDRATE: 50 INJECTION, SOLUTION INTRAVENOUS at 15:26

## 2017-01-01 RX ADMIN — WATER 10 ML: 1 INJECTION INTRAMUSCULAR; INTRAVENOUS; SUBCUTANEOUS at 20:51

## 2017-01-01 RX ADMIN — DEXTROSE MONOHYDRATE: 50 INJECTION, SOLUTION INTRAVENOUS at 01:33

## 2017-01-01 RX ADMIN — LORAZEPAM 1 MG: 2 INJECTION INTRAMUSCULAR; INTRAVENOUS at 23:06

## 2017-01-01 RX ADMIN — CHLORHEXIDINE GLUCONATE 15 ML: 1.2 RINSE ORAL at 20:32

## 2017-01-01 RX ADMIN — LORAZEPAM 1 MG: 2 INJECTION INTRAMUSCULAR; INTRAVENOUS at 00:07

## 2017-01-01 ASSESSMENT — PAIN SCALES - PAIN ASSESSMENT IN ADVANCED DEMENTIA (PAINAD)
NEGVOCALIZATION: 0
BODYLANGUAGE: 0
FACIALEXPRESSION: 0
CONSOLABILITY: 0
FACIALEXPRESSION: 0
NEGVOCALIZATION: 0
BREATHING: 0
TOTALSCORE: 0
NEGVOCALIZATION: 0
TOTALSCORE: 0
FACIALEXPRESSION: 0
FACIALEXPRESSION: 0
NEGVOCALIZATION: 0
CONSOLABILITY: 0
BREATHING: 1
BREATHING: 0
BREATHING: 1
NEGVOCALIZATION: 0
BODYLANGUAGE: 0
BODYLANGUAGE: 0
FACIALEXPRESSION: 0
BODYLANGUAGE: 0
TOTALSCORE: 2
TOTALSCORE: 1
CONSOLABILITY: 1
CONSOLABILITY: 0
CONSOLABILITY: 0
BODYLANGUAGE: 0
BREATHING: 1
TOTALSCORE: 1

## 2017-01-01 ASSESSMENT — PULMONARY FUNCTION TESTS
PIF_VALUE: 30.8
PIF_VALUE: 32.4
PIF_VALUE: 36.1
PIF_VALUE: 30.9
PIF_VALUE: 34.5
PIF_VALUE: 34.2
PIF_VALUE: 31
PIF_VALUE: 37.2
PIF_VALUE: 34.4
PIF_VALUE: 33.7
PIF_VALUE: 0
PIF_VALUE: 41.6
PIF_VALUE: 48.8
PIF_VALUE: 45.4
PIF_VALUE: 34.7
PIF_VALUE: 33.8
PIF_VALUE: 43.3
PIF_VALUE: 30.7
PIF_VALUE: 33.6
PIF_VALUE: 34.3
PIF_VALUE: 33.9
PIF_VALUE: 33.1
PIF_VALUE: 34
PIF_VALUE: 35
PIF_VALUE: 34.6
PIF_VALUE: 34.4
PIF_VALUE: 32.2
PIF_VALUE: 32.6

## 2017-01-01 ASSESSMENT — PAIN SCALES - GENERAL
PAINLEVEL_OUTOF10: 0
PAINLEVEL_OUTOF10: 3
PAINLEVEL_OUTOF10: 0
PAINLEVEL_OUTOF10: 0

## 2017-01-01 ASSESSMENT — PAIN SCALES - WONG BAKER

## 2017-08-01 ENCOUNTER — APPOINTMENT (OUTPATIENT)
Dept: CT IMAGING | Facility: HOSPITAL | Age: 82
End: 2017-08-01

## 2017-08-01 ENCOUNTER — APPOINTMENT (OUTPATIENT)
Dept: GENERAL RADIOLOGY | Facility: HOSPITAL | Age: 82
End: 2017-08-01

## 2017-08-01 ENCOUNTER — HOSPITAL ENCOUNTER (EMERGENCY)
Facility: HOSPITAL | Age: 82
Discharge: HOME | End: 2017-08-01
Attending: EMERGENCY MEDICINE | Admitting: FAMILY MEDICINE

## 2017-08-01 VITALS
OXYGEN SATURATION: 98 % | DIASTOLIC BLOOD PRESSURE: 55 MMHG | SYSTOLIC BLOOD PRESSURE: 145 MMHG | TEMPERATURE: 98.1 F | HEIGHT: 71 IN | RESPIRATION RATE: 16 BRPM | HEART RATE: 80 BPM | BODY MASS INDEX: 21.76 KG/M2 | WEIGHT: 155.4 LBS

## 2017-08-01 DIAGNOSIS — T07.XXXA MULTIPLE CONTUSIONS: Primary | ICD-10-CM

## 2017-08-01 LAB
ALBUMIN SERPL-MCNC: 3.6 G/DL (ref 3.5–5)
ALBUMIN/GLOB SERPL: 1 G/DL (ref 1.1–2.5)
ALP SERPL-CCNC: 93 U/L (ref 24–120)
ALT SERPL W P-5'-P-CCNC: 33 U/L (ref 0–54)
ANION GAP SERPL CALCULATED.3IONS-SCNC: 7 MMOL/L (ref 4–13)
APTT PPP: 30.9 SECONDS (ref 24.1–34.8)
AST SERPL-CCNC: 28 U/L (ref 7–45)
BASOPHILS # BLD AUTO: 0.03 10*3/MM3 (ref 0–0.2)
BASOPHILS NFR BLD AUTO: 0.5 % (ref 0–2)
BILIRUB SERPL-MCNC: 0.4 MG/DL (ref 0.1–1)
BUN BLD-MCNC: 24 MG/DL (ref 5–21)
BUN/CREAT SERPL: 16.7 (ref 7–25)
CALCIUM SPEC-SCNC: 8.9 MG/DL (ref 8.4–10.4)
CHLORIDE SERPL-SCNC: 102 MMOL/L (ref 98–110)
CO2 SERPL-SCNC: 32 MMOL/L (ref 24–31)
CREAT BLD-MCNC: 1.44 MG/DL (ref 0.5–1.4)
DEPRECATED RDW RBC AUTO: 49.6 FL (ref 40–54)
EOSINOPHIL # BLD AUTO: 0.14 10*3/MM3 (ref 0–0.7)
EOSINOPHIL NFR BLD AUTO: 2.5 % (ref 0–4)
ERYTHROCYTE [DISTWIDTH] IN BLOOD BY AUTOMATED COUNT: 15.9 % (ref 12–15)
GFR SERPL CREATININE-BSD FRML MDRD: 42 ML/MIN/1.73
GLOBULIN UR ELPH-MCNC: 3.5 GM/DL
GLUCOSE BLD-MCNC: 120 MG/DL (ref 70–100)
HCT VFR BLD AUTO: 39.2 % (ref 37–47)
HGB BLD-MCNC: 11.8 G/DL (ref 12–16)
IMM GRANULOCYTES # BLD: 0.02 10*3/MM3 (ref 0–0.03)
IMM GRANULOCYTES NFR BLD: 0.4 % (ref 0–5)
INR PPP: 1.08 (ref 0.91–1.09)
LYMPHOCYTES # BLD AUTO: 1.48 10*3/MM3 (ref 0.72–4.86)
LYMPHOCYTES NFR BLD AUTO: 25.9 % (ref 15–45)
MCH RBC QN AUTO: 25.7 PG (ref 28–32)
MCHC RBC AUTO-ENTMCNC: 30.1 G/DL (ref 33–36)
MCV RBC AUTO: 85.4 FL (ref 82–98)
MONOCYTES # BLD AUTO: 0.47 10*3/MM3 (ref 0.19–1.3)
MONOCYTES NFR BLD AUTO: 8.2 % (ref 4–12)
NEUTROPHILS # BLD AUTO: 3.57 10*3/MM3 (ref 1.87–8.4)
NEUTROPHILS NFR BLD AUTO: 62.5 % (ref 39–78)
NRBC BLD MANUAL-RTO: 0 /100 WBC (ref 0–0)
PLATELET # BLD AUTO: 235 10*3/MM3 (ref 130–400)
PMV BLD AUTO: 9.1 FL (ref 6–12)
POTASSIUM BLD-SCNC: 5.1 MMOL/L (ref 3.5–5.3)
PROT SERPL-MCNC: 7.1 G/DL (ref 6.3–8.7)
PROTHROMBIN TIME: 14.3 SECONDS (ref 11.9–14.6)
RBC # BLD AUTO: 4.59 10*6/MM3 (ref 4.2–5.4)
SODIUM BLD-SCNC: 141 MMOL/L (ref 135–145)
WBC NRBC COR # BLD: 5.71 10*3/MM3 (ref 4.8–10.8)

## 2017-08-01 PROCEDURE — 72125 CT NECK SPINE W/O DYE: CPT

## 2017-08-01 PROCEDURE — 36415 COLL VENOUS BLD VENIPUNCTURE: CPT | Performed by: PHYSICIAN ASSISTANT

## 2017-08-01 PROCEDURE — 99283 EMERGENCY DEPT VISIT LOW MDM: CPT

## 2017-08-01 PROCEDURE — 70450 CT HEAD/BRAIN W/O DYE: CPT

## 2017-08-01 PROCEDURE — 73502 X-RAY EXAM HIP UNI 2-3 VIEWS: CPT

## 2017-08-01 PROCEDURE — 85610 PROTHROMBIN TIME: CPT | Performed by: PHYSICIAN ASSISTANT

## 2017-08-01 PROCEDURE — 80053 COMPREHEN METABOLIC PANEL: CPT | Performed by: PHYSICIAN ASSISTANT

## 2017-08-01 PROCEDURE — 85730 THROMBOPLASTIN TIME PARTIAL: CPT | Performed by: PHYSICIAN ASSISTANT

## 2017-08-01 PROCEDURE — 70486 CT MAXILLOFACIAL W/O DYE: CPT

## 2017-08-01 PROCEDURE — 85025 COMPLETE CBC W/AUTO DIFF WBC: CPT | Performed by: PHYSICIAN ASSISTANT

## 2017-08-01 NOTE — ED PROVIDER NOTES
Subjective   History of Present Illness  84-year-old female presents with her brother who has a chief concern of fall.  The patient had fallen yesterday and got back into bed.  The brother noticed that she had right-sided facial swelling today and the patient has been complaining of head and face pain as well as right hip pain.  At this time, the patient has no complaints.  The mother is also concerned because the patient is on a blood thinner,  Review of Systems   All other systems reviewed and are negative.      Past Medical History:   Diagnosis Date   • Anxiety    • Chronic kidney disease     Stage 3 [facility]   • Coronary artery disease    • Degenerative disc disease, lumbar    • Dementia    • Hyperlipidemia    • Hypertension    • Macular degeneration        Allergies   Allergen Reactions   • Contrast Dye    • Penicillins        Past Surgical History:   Procedure Laterality Date   • CHOLECYSTECTOMY     • CORONARY ARTERY BYPASS GRAFT     • HYSTERECTOMY     • SPINE SURGERY         Family History   Problem Relation Age of Onset   • Breast cancer Daughter    • Diabetes Other    • Hypertension Other        Social History     Social History   • Marital status:      Spouse name: N/A   • Number of children: N/A   • Years of education: N/A     Social History Main Topics   • Smoking status: Former Smoker   • Smokeless tobacco: Never Used   • Alcohol use No   • Drug use: No   • Sexual activity: Defer     Other Topics Concern   • None     Social History Narrative           Objective   Physical Exam   Constitutional: She appears well-developed and well-nourished.   HENT:   Head: Normocephalic.   Eyes: EOM are normal. Pupils are equal, round, and reactive to light.   Neck: Normal range of motion.   Cardiovascular: Normal rate and regular rhythm.    Pulmonary/Chest: Effort normal.   Neurological: She is alert.   Baseline dementia   Nursing note and vitals reviewed.      Procedures         ED  Course  ED Course   Value Comment By Time   Creatinine: (!) 1.44 (Reviewed) Joshua Mercer PA-C 08/01 1838                  MDM  Number of Diagnoses or Management Options  Diagnosis management comments: Negative CT of head, neck, face.  Negative plain film of the pelvis and hip.  We will discharge and have her take over-the-counter analgesics for pain.  She denies any pain at this time.       Amount and/or Complexity of Data Reviewed  Clinical lab tests: ordered and reviewed  Tests in the radiology section of CPT®: ordered and reviewed  Tests in the medicine section of CPT®: ordered and reviewed  Decide to obtain previous medical records or to obtain history from someone other than the patient: yes    Risk of Complications, Morbidity, and/or Mortality  Presenting problems: moderate  Diagnostic procedures: moderate  Management options: moderate    Patient Progress  Patient progress: improved      Final diagnoses:   Multiple contusions            Joshua Mercer PA-C  08/02/17 0107

## 2017-08-02 NOTE — ED NOTES
Report given to nurse at [facility].      Maximiliano Nance RN  08/01/17 7317

## 2017-08-02 NOTE — ED NOTES
[facility] contacted for pt transport. Awaiting their arrival.      Maximiliano Nance RN  08/01/17 2031

## 2017-10-28 PROBLEM — F02.818 LATE ONSET ALZHEIMER'S DISEASE WITH BEHAVIORAL DISTURBANCE (HCC): Status: ACTIVE | Noted: 2017-01-01

## 2017-10-28 PROBLEM — N30.00 ACUTE CYSTITIS WITHOUT HEMATURIA: Status: ACTIVE | Noted: 2017-01-01

## 2017-10-28 PROBLEM — J18.9 HCAP (HEALTHCARE-ASSOCIATED PNEUMONIA): Status: ACTIVE | Noted: 2017-01-01

## 2017-10-28 PROBLEM — J96.01 ACUTE RESPIRATORY FAILURE WITH HYPOXIA (HCC): Status: ACTIVE | Noted: 2017-01-01

## 2017-10-28 PROBLEM — E87.20 METABOLIC ACIDOSIS: Status: ACTIVE | Noted: 2017-01-01

## 2017-10-28 PROBLEM — A41.9 SEPTIC SHOCK (HCC): Status: ACTIVE | Noted: 2017-01-01

## 2017-10-28 PROBLEM — N17.9 AKI (ACUTE KIDNEY INJURY) (HCC): Status: ACTIVE | Noted: 2017-01-01

## 2017-10-28 PROBLEM — G30.1 LATE ONSET ALZHEIMER'S DISEASE WITH BEHAVIORAL DISTURBANCE (HCC): Status: ACTIVE | Noted: 2017-01-01

## 2017-10-28 PROBLEM — R65.21 SEPTIC SHOCK (HCC): Status: ACTIVE | Noted: 2017-01-01

## 2017-10-28 NOTE — ED NOTES
glidoscope being used to intubate patient.  Dr adams attempting intubation     Rosangela Hurley, RN  10/28/17 [id number]

## 2017-10-28 NOTE — ED PROVIDER NOTES
suspicious for developing right perihilar infiltrate. Signed by Dr Kali Emery on 10/28/2017 1:20 PM      XR Chest Portable   Final Result   1. Chronic lung changes. 2. Findings compatible with developing right perihilar pneumonia. Signed by Dr Kali Emery on 10/28/2017 11:39 AM      XR Chest Portable    (Results Pending)   XR Chest Portable    (Results Pending)     2:44 PM discussed case with hospitalist Dr. Tonny Bourne he agrees to place the patient in the ICU for treatment. Third chest x-ray was done status post central line placement x-ray shows no evidence of pneumothorax with satisfactory placement of central support line.     Labs Reviewed   URINE CULTURE - Abnormal; Notable for the following:        Result Value    Urine Culture, Routine >100,000 CFU/ml (*)     Organism Escherichia coli (*)     All other components within normal limits    Narrative:     ORDER#: [id number]                          ORDERED BY: Antoine Peoples  SOURCE: Urine Clean Catch                  COLLECTED:  10/28/17 12:55  ANTIBIOTICS AT [facility].:                      RECEIVED :  10/28/17 12:58   LACTIC ACID, PLASMA - Abnormal; Notable for the following:     Lactic Acid 3.4 (*)     All other components within normal limits    Narrative:     [facility] tel. ,  Chemistry results called to and read back by Donna parekh in er, 10/28/2017  11:51, by [address] [place] [address] , ARTERIAL - Abnormal; Notable for the following:     pH, Arterial 7.260 (*)     pCO2, Arterial 77.0 (*)     pO2, Arterial 73.0 (*)     HCO3, Arterial 34.6 (*)     Base Excess, Arterial 5.0 (*)     All other components within normal limits    Narrative:     Truman Golden MD ER, 10/28/2017 11:27, by Karol Davenport   URINALYSIS - Abnormal; Notable for the following:     Clarity, UA CLOUDY (*)     Bilirubin Urine SMALL (*)     Blood, Urine MODERATE (*)     Protein,  (*)     Leukocyte Esterase, Urine LARGE (*)     All other components within normal within normal limits   CBC - Abnormal; Notable for the following:     WBC 12.8 (*)     RBC 3.95 (*)     Hemoglobin 10.4 (*)     Hematocrit 33.6 (*)     MCH 26.3 (*)     MCHC 31.0 (*)     RDW 15.9 (*)     All other components within normal limits   POCT GLUCOSE - Abnormal; Notable for the following:     POC Glucose 174 (*)     All other components within normal limits   POCT VENOUS - Abnormal; Notable for the following:     POC Troponin I 0.17 (*)     All other components within normal limits   POCT GLUCOSE - Normal   CULTURE BLOOD #1   CULTURE BLOOD #2   POTASSIUM, WHOLE BLOOD    Narrative:     Sonny Castellano MD ER, 10/28/2017 11:27, by Dora Reyna   APTT   POTASSIUM, WHOLE BLOOD   POTASSIUM, WHOLE BLOOD   VANCOMYCIN, RANDOM   POCT TROPONIN     DISPOSITION Admitted     CRITICAL CARE TIME   Total Critical Care time was 45 minutes, excluding separately reportable procedures. There was a high probability of clinically significant/life threatening deterioration in the patient's condition which required my urgent intervention. This was due to cardiovascular, neurological, respiratory, and metabolic impairment. With the multiorgan system failure presentation and discussion of DNR, this resulted in multiple conversations with the family. Chart review was complicated by the fact that the patient typically attends another facility. Of note the central line and intubation are not included in this time. The family's end decision was to allow for a chemical code and for mechanical ventilation. MDM  Reviewed: previous chart  Reviewed previous: labs and ECG  Interpretation: labs, ECG and x-ray  Total time providing critical care: 30-74 minutes. This excludes time spent performing separately reportable procedures and services. Consults: Hospitalist.        Impression/Plan  I reviewed and agree with the findings and plan documented in her note .     Electronically signed by Chyna Crowley MD on 10/28/17 at 11:50 AM

## 2017-10-28 NOTE — ED NOTES
Bed: 07  Expected date: 10/28/17  Expected time: 10:14 AM  Means of arrival:   Comments:  [facility]     Michelle Cuadra RN  10/28/17 1171

## 2017-10-28 NOTE — H&P
Hospital Medicine History & Physical      PCP: No primary care provider on file. Date of Admission: 10/28/2017    Date of Service: Pt seen/examined on 10/28/2017 and Admitted to Inpatient with expected LOS greater than two midnights due to medical therapy. Chief Complaint:  Altered mental status      History Of Present Illness:    80 y.o. female who presented to ER from [facility] with altered mental status. Increasing lethargy with worsening respiratory distress. All information is gathered from the chart and her sister whom I spoke to on the phone. No family is currently present. Patient intubated and sedated currently. Past Medical History:          Diagnosis Date    Anemia     Anxiety     Arthritis     CAD (coronary artery disease)     Chronic kidney disease     Dementia     Depression     D[facility] (deep vein thrombosis) in pregnancy ([facility] [address]     Hx of blood clots     Hyperlipidemia     Hypertension     SVT [facility]ra[facility]ricular tachycardia[facility])        Past Surgical History:          Procedure Laterality Date    CORONARY ARTERY BYPASS GRAFT         Medications Prior to Admission:      Prior to Admission medications    Medication Sig Start Date End Date Taking?  Authorizing Provider   lisinopril (PRINIVIL;ZESTRIL) 20 MG tablet Take 20 mg by mouth daily   Yes Historical Provider, MD   megestrol (MEGACE) 40 MG tablet Take 40 mg by mouth daily   Yes Historical Provider, MD   metoprolol succinate (TOPROL XL) 25 MG extended release tablet Take 25 mg by mouth 2 times daily   Yes Historical Provider, MD   atorvastatin (LIPITOR) 20 MG tablet Take 20 mg by mouth nightly   Yes Historical Provider, MD   donepezil (ARICEPT) 10 MG tablet Take 10 mg by mouth nightly   Yes Historical Provider, MD   apixaban (ELIQUIS) 2.5 MG TABS tablet Take 2.5 mg by mouth 2 times daily   Yes Historical Provider, MD   memantine ER (NAMENDA XR) 28 MG CP24 extended release capsule Take 28 mg by mouth daily Yes Historical Provider, MD   traZODone (DESYREL) 100 MG tablet Take 100 mg by mouth nightly   Yes Historical Provider, MD   QUEtiapine (SEROQUEL) 200 MG tablet Take 200 mg by mouth nightly   Yes Historical Provider, MD   busPIRone (BUSPAR) 10 MG tablet Take 20 mg by mouth 2 times daily   Yes Historical Provider, MD   guaiFENesin-codeine (TUSSI-ORGANIDIN NR) 100-10 MG/5ML syrup Take 5 mLs by mouth every 4 hours as needed for Cough   Yes Historical Provider, MD   senna (SENOKOT) 8.6 MG tablet Take 1 tablet by mouth daily as needed for Constipation   Yes Historical Provider, MD   traMADol (ULTRAM) 50 MG tablet Take 50 mg by mouth every 6 hours as needed for Pain   Yes Historical Provider, MD       Allergies: Iv dye [iodides] and Pcn [penicillins]    Social History:      The patient currently lives at [facility]. TOBACCO:   has an unknown smoking status. She has never used smokeless tobacco.  ETOH:   reports that she does not drink alcohol. Family History:      Unobtainable. REVIEW OF SYSTEMS:   Pertinent items are noted in HPI. 14 point reveiw of systems otherwise unobtainable. PHYSICAL EXAM:    BP (!) 118/57   Pulse 110   Temp 98.4 °F (36.9 °C) (Temporal)   Resp 14   Ht 5' 3\" (1.6 m)   Wt 120 lb (54.4 kg)   SpO2 100%   BMI 21.26 kg/m²     General appearance: Mechanical ventilation and sedated. Acutely on chronically ill appearing. Cachetic  HEENT: Normal cephalic, atraumatic without obvious deformity. Pupils equal, round, and reactive to light. Conjunctivae/corneas clear. Neck: Supple, with full range of motion. No jugular venous distention. Trachea midline. No masses palpated. ET tube in place. Respiratory: On mechanical ventilation, coarse bilaterally, no wheezes noted. Diminished bilateral bases. Cardiovascular: Tachycardic with no discernible murmurs, rubs or gallops noted, heart tones distant. Abdomen: Soft, non-distended with normal bowel sounds.   No hepatosplenomegaly. Musculoskeletal: No clubbing, cyanosis or edema bilaterally. Full range of motion without deformity. Skin: Skin very dry, cool and clammy. Turgor poor. Neurologic:  Unable to assess. Does move extremities in bed, no facial droop noted. Psychiatric: Sedated. Radiology:      XR Chest Portable [[id number]] Resulted: 10/28/17 1603   Updated: 10/28/17 1505    Narrative:     XR CHEST PORTABLE   10/28/2017 3:02 PM  History: Respiratory failure. Intubation. Line placement. Portable chest x-ray compared with 12:37 PM.  Left jugular central line placed. Good position. No pneumothorax. Persistent patchy right perihilar infiltrate. The endotracheal tube remains in good position. Impression:     1. Well-positioned left jugular central line. 2. No pneumothorax. CT Head:  Impression:     1. No acute intracranial abnormality is seen. XR Chest Portable [[id number]] Resulted: 10/28/17 1420   Updated: 10/28/17 1322    Narrative:     XR CHEST PORTABLE   10/28/2017 1:19 PM  History: Respiratory failure. Ventilator. Portable chest x-ray compared with 11:34 AM.  Interval intubation. Well-positioned endotracheal tube. ET tube tip 3.3 cm above the bernarda. There is some patchy right perihilar infiltrate suspicious for  developing pneumonia. This was extension weighted on the previous exam due to rightward  rotation. The left lung is clear. Heart size is normal.   Impression:     1. Well-positioned endotracheal tube. 2. Findings suspicious for developing right perihilar infiltrate.        Micro:    Microscopic Urinalysis [[id number]] (Abnormal) Collected: 10/28/17 1255   Updated: 10/28/17 1322     WBC, UA TNTC (A) /HPF    RBC, UA 6-10 (A) /HPF    Epi Cells 10-20 /HPF    Bacteria, UA 4+ /HPF    Amorphous, UA 2+ (A) /HPF     Urinalysis [[id number]] (Abnormal) Collected: 10/28/17 1255   Updated: 10/28/17 1304    Specimen Source: Urine, indwelling catheter     Color, UA Yellow    Clarity, eliquis for now, heparin.     Diet: NPO  Code Status: Medications are ok, no compression per POA      PT/OT Eval Status: Pending     Dispo - TBD       Jorden Styles DO

## 2017-10-28 NOTE — ED NOTES
patient intubated, color change detected, lung sounds bilat     Jennifer Chaudhary RN  10/28/17 2708

## 2017-10-28 NOTE — ED NOTES
Trouble finding pulse. Doc called to room bp 48/28. manual 30.       Jacqueline Balbuena RN  10/28/17 0737

## 2017-10-28 NOTE — ED NOTES
Patient bp is [address] [facility], [address] [facility]  10/28/17 1335

## 2017-10-28 NOTE — PROGRESS NOTES
Procedure Component Value Units Date/Time     Blood Gas, Arterial [[id number]] (Abnormal) Collected: 10/28/17 1126     Specimen: Blood gases Updated: 10/28/17 1127      pH, Arterial 7.260 (LL)      pCO2, Arterial 77.0 (HH) mmHg       pO2, Arterial 73.0 (L) mmHg       HCO3, Arterial 34.6 (H) mmol/L       Base Excess, Arterial 5.0 (H) mmol/L       Hemoglobin, Art, Extended 12.9 g/dL       O2 Sat, Arterial 91.8 %       Carboxyhgb, Arterial 2.6 %       Methemoglobin, Arterial 1.0 %       O2 Content, Arterial 16.7 mL/dL       O2 Therapy Unknown     Narrative:       CALL  Tuan Mejia MD ER, 10/28/2017 11:27, by Moses Barragan     Potassium, Whole Blood [[id number]] Collected: 10/28/17 1126      Updated: 10/28/17 1127      Potassium, Whole Blood 4.5     Narrative:       CALL Nirmala Galloway MD ER, 10/28/2017 11:27, by Moses Barragan         Pt on bipap 12/6 10 lpm for approx 20 min.  RR 32 site RR at+

## 2017-10-29 NOTE — PLAN OF CARE
Problem: Falls - Risk of  Goal: Absence of falls  Outcome: Ongoing      Problem: Risk for Impaired Skin Integrity  Goal: Tissue integrity - skin and mucous membranes  Structural intactness and normal physiological function of skin and  mucous membranes.    Outcome: Ongoing      Problem: Breathing Pattern - Ineffective:  Goal: Ability to achieve and maintain a regular respiratory rate will improve  Ability to achieve and maintain a regular respiratory rate will improve   Outcome: Ongoing      Problem: Pain:  Goal: Pain level will decrease  Pain level will decrease   Outcome: Ongoing    Goal: Control of acute pain  Control of acute pain   Outcome: Ongoing    Goal: Control of chronic pain  Control of chronic pain   Outcome: Ongoing      Problem: Restraint Use - Nonviolent/Non-Self-Destructive Behavior:  Goal: Absence of restraint indications  Absence of restraint indications   Outcome: Ongoing    Goal: Absence of restraint-related injury  Absence of restraint-related injury   Outcome: Ongoing

## 2017-10-29 NOTE — PROGRESS NOTES
Pharmacy Note  Vancomycin Consult    Rober Pacheco is a 80 y.o. female started on Vancomycin for Pneumonia/Sepsis; consult received from Dr. Jillian Gold to manage therapy. Also receiving the following antibiotics: Merrem    Patient Active Problem List   Diagnosis    [facility] ([facility] [address]    Acute cystitis without hematuria    HCAP (healthcare-associated pneumonia)    Late onset Alzheimer's disease with behavioral disturbance    TU (acute kidney injury) ([facility] [facility]    Acute respiratory failure with hypoxia ([facility] [facility]    Metabolic acidosis       Allergies: Iv dye [iodides] and Pcn [penicillins]     Temp max: 99.5    Recent Labs      10/28/17   1115   BUN  60*       Recent Labs      10/28/17   1115   CREATININE  3.0*       Recent Labs      10/28/17   1130   WBC  13.6*         Intake/Output Summary (Last 24 hours) at 10/28/17 1906  Last data filed at 10/28/17 1302   Gross per 24 hour   Intake 1100 ml   Output 0 ml   Net 1100 ml       Culture Date Source Results   10/28/17 Urine No result   10/28/17 Blood No result            Ht Readings from Last 1 Encounters:   10/28/17 5' 3\" (1.6 m)        Wt Readings from Last 1 Encounters:   10/28/17 120 lb (54.4 kg)         Body mass index is 21.26 kg/m². Estimated Creatinine Clearance: 12 mL/min (based on SCr of 3 mg/dL). Assessment/Plan:  Will initiate vancomycin pulse dosing due to renal impairment. Vancomycin 750 mg IV x 1 dose tonight. Timing of trough level will be determined based on culture results, renal function, and clinical response. Thank you for the consult. Will continue to follow.     Electronically signed by Katrina Jurado [facility] on 10/28/2017 at 7:06 PM

## 2017-10-29 NOTE — PROGRESS NOTES
Pt's BP, HR and rhythm continues to be labile bouncing from regular SR 90's to irregular ST with frequent PAC's and PVC's, SBP from 90's to 140's. UOP approx 10 ml/hr. Electronically signed by Lucie Suresh RN on 10/29/2017 at 1:52 AM

## 2017-10-29 NOTE — PROGRESS NOTES
non-distended with normal bowel sounds. No hepatosplenomegaly. Musculoskeletal: No clubbing, cyanosis or edema bilaterally. Full range of motion without deformity. Skin: Skin very dry, cool and clammy. Turgor poor. Neurologic:  Unable to assess. Does move extremities in bed, no facial droop noted. Psychiatric: Sedated. Medications:  Scheduled Meds:   chlorhexidine  15 mL Mouth/Throat BID    pantoprazole  40 mg Intravenous Daily    And    sodium chloride (PF)  10 mL Intravenous Daily    meropenem  0.5 g Intravenous Q12H    And    sterile water  10 mL Injection Q12H    vancomycin (VANCOCIN) intermittent dosing (placeholder)   Other RX Placeholder       PRN Meds:  albuterol sulfate HFA, fentanNYL, LORazepam    Results:  CBC: Recent Labs      10/28/17   1130  10/29/17   0025   WBC  13.6*  12.8*   HGB  14.0  10.4*   HCT  48.4*  33.6*   MCV  90.0  85.1   PLT  221  177     BMP: Recent Labs      10/28/17   1115   10/28/17   1834  10/29/17   0025  10/29/17   0433   NA  145   --    --   148*   --    K  5.0   < >  4.1  4.3  4.2   CL  100   --    --   108   --    CO2  34*   --    --   32*   --    BUN  60*   --    --   58*   --    CREATININE  3.0*   --    --   2.7*   --     < > = values in this interval not displayed.      LIVER PROFILE:   Recent Labs      10/28/17   1115   AST  18   ALT  9   BILITOT  0.4   ALKPHOS  106*     PT/INR:   Recent Labs      10/28/17   1115   PROTIME  16.9*   INR  1.37*     APTT:   Recent Labs      10/28/17   1115   APTT  31.8     UA:  Recent Labs      10/28/17   1255   COLORU  Yellow   PHUR  5.0   WBCUA  TNTC*   RBCUA  6-10*   BACTERIA  4+   CLARITYU  CLOUDY*   SPECGRAV  >=1.030   LEUKOCYTESUR  LARGE*   UROBILINOGEN  1.0   BILIRUBINUR  SMALL*   BLOODU  MODERATE*   GLUCOSEU  Negative   AMORPHOUS  2+*       Cultures:  Urine Culture [[id number]] (Abnormal) Collected: 10/28/17 1255   Updated: 10/29/17 0729    Specimen Source: Urine, clean catch     Urine Culture, Routine >100,000 CFU/ml (A)    Organism Escherichia coli (A)    Urine Culture, Routine --    Heavy growth   Sensitivity to follow               Films:  CXR 10/29/17:  Impression:     1. Well-positioned nasogastric tube. 2. Similar appearance of right basilar infiltrate. CXR 10/28/17:  Impression:     1. Well-positioned left jugular central line. 2. No pneumothorax. CXR 10/28/17:  Impression:     1. Well-positioned endotracheal tube. 2. Findings suspicious for developing right perihilar infiltrate. CT Head:  Impression:     1. No acute intracranial abnormality is seen. Assessment:  Active Hospital Problems    Diagnosis Date Noted    [facility] [A41.9, R65.21] 10/28/2017    Acute cystitis without hematuria [N30.00] 10/28/2017    HCAP (healthcare-associated pneumonia) [J18.9] 10/28/2017    Late onset Alzheimer's disease with behavioral disturbance [G30.1, F02.81] 10/28/2017    TU (acute kidney injury) ([facility] [address] [N17.9] 10/28/2017    Acute respiratory failure with hypoxia ([facility] [J96.01] 56/41/8781    Metabolic acidosis [P56.6] 10/28/2017           Plan:  Vent changes - RR to 10, Decreased FiO2 to 35%. Continue merrem and vancomycin for now. Sputum culture. Continue routine vent orders. IVF at 100 cc/hr. Repeat labs in am.  Prognosis remains poor, condition critical.  No family present at this time. See all orders. Further recommendations per clinical course.     Critical Care time:  44 minutes      Jorden Styles DO

## 2017-10-30 NOTE — PROGRESS NOTES
Palliative Care: Met with pt brother/POA Mayuri Frederickalfredo. Explained and initiated palliative care. Brother states he saw his sister at  on Thursday evening and brought her a piece of fish. He said she was asleep when he got there. He did not wake her at that time. Then Friday morning he was called that pt was being transferred to hospital.  Pt had a change in condition/AMS as noted in ED visit. Past Medical History:  Anemia, Anxiety, Arthritis, CAD, CKD, Dementia, Depression, DVT, HTN, SVT        Advance Directives:  Pt does not have an advance directive. Discussed with POA if he and pt ever had this discussion. He states \"No\". He also tells me that family does not want pt \"shocked\" or CPR that could break her ribs. He is going to allow the oler sister, Saumya Fenton make decision about pt coming off vent. Brother states someone has talked with them about hospice care. I did discuss with him that PC will be available to them for discussion of hospice care. Pain/Other Symptoms:  Pt is restless, trying to pull at ventilator tubing. Resp therapy in, pt soft restraints on and secure now. Pt nurse, Cristy Snyder aware of pt restlessness. Activity:  Pt remains on vent           Psychological/Spiritual:  Brother states pt has good family support. Pt is a [employer] and attended [employer] services at the . Patient/Family Discussion:   Discussion about how/why pt was transferred to ED. And discussion about decision making and advance directive as noted above. Plan/expectations:  Pt prognosis is poor. Family knows they need to make decision about pt care. Extubation? Hospice? Long term goals:   Not assessed at this time. PC will follow for support and goals of care.     Electronically signed by David Gonzalez RN on 10/30/2017 at 12:30 PM

## 2017-10-30 NOTE — PROGRESS NOTES
Pharmacy Vancomycin Consult     Vancomycin Day: 3  Current Dosing: Pulse dosing    Temp max:  99    Recent Labs      10/28/17   1115  10/29/17   0025   BUN  60*  58*       Recent Labs      10/28/17   1115  10/29/17   0025   CREATININE  3.0*  2.7*       Recent Labs      10/28/17   1130  10/29/17   0025   WBC  13.6*  12.8*         Intake/Output Summary (Last 24 hours) at 10/30/17 0146  Last data filed at 10/30/17 0000   Gross per 24 hour   Intake 2605.68 ml   Output 500 ml   Net 2105.68 ml       Culture Date Source Results   10/28/17 Blood No growth   10/28/17 Urine E coli            Ht Readings from Last 1 Encounters:   10/28/17 5' 7\" (1.702 m)        Wt Readings from Last 1 Encounters:   10/30/17 146 lb (66.2 kg)         Body mass index is 22.87 kg/m². Estimated Creatinine Clearance: 15 mL/min (based on SCr of 2.7 mg/dL).     Trough: 5.2    Assessment/Plan: Give Vancomycin 1000mg x1 dose today with random level in AM    Electronically signed by Otis Fortune [address] [place] [address] on 10/30/2017 at 1:46 AM

## 2017-10-30 NOTE — PLAN OF CARE
Problem: Nutrition  Goal: Optimal nutrition therapy  Outcome: Ongoing  Nutrition Problem: Inadequate oral intake  Intervention: Food and/or Nutrient Delivery: Continue NPO, Start Tube Feeding  Nutritional Goals: Meet nutritional needs via RADHA or diet advancement.

## 2017-10-30 NOTE — PLAN OF CARE
Problem: Falls - Risk of  Goal: Absence of falls  Outcome: Ongoing      Problem: Risk for Impaired Skin Integrity  Goal: Tissue integrity - skin and mucous membranes  Structural intactness and normal physiological function of skin and  mucous membranes.    Outcome: Ongoing      Problem: Breathing Pattern - Ineffective:  Goal: Ability to achieve and maintain a regular respiratory rate will improve  Ability to achieve and maintain a regular respiratory rate will improve   Outcome: Ongoing      Problem: Pain:  Goal: Pain level will decrease  Pain level will decrease   Outcome: Ongoing      Problem: Restraint Use - Nonviolent/Non-Self-Destructive Behavior:  Goal: Absence of restraint indications  Absence of restraint indications   Outcome: Ongoing    Goal: Absence of restraint-related injury  Absence of restraint-related injury   Outcome: Ongoing

## 2017-10-30 NOTE — PROGRESS NOTES
Critical Care Progress Note      Events of Last 24 hours: no acute events noted overnight, remains on ventilator    Chief Complaint:  Respiratory failure, septic shock    Subjective: Remains on ventilator and sedated. Hypertensive at times overnight, HR remained stable. Withdraws to pain at times. ROS:  Pertinent items are noted in HPI. 14 point reveiw of systems otherwise negative. Invasive Lines: Left IJ CVL (day 3)       MV:  D3    Recent Labs      10/29/17   0433  10/30/17   0431   PHART  7.530*  7.390   ISY2OEX  37.0  46.0*   PO2ART  102.0*  67.0*       MV Settings:  Vent Mode: SIMV/VC Rate Set: 10 bmp/Vt Ordered: 450 mL/ /FiO2 : 35 %  ABGs:   Lab Results   Component Value Date    PHART 7.390 10/30/2017    PO2ART 67.0 10/30/2017    SDK8JMT 46.0 10/30/2017       IV:   dextrose      norepinephrine Stopped (10/28/17 2055)    propofol Stopped (10/30/17 0815)       Vitals:  BP (!) 161/61   Pulse 65   Temp 97.2 °F (36.2 °C) (Temporal)   Resp 10   Ht 5' 7\" (1.702 m)   Wt 146 lb (66.2 kg)   SpO2 95%   BMI 22.87 kg/m²        Intake/Output Summary (Last 24 hours) at 10/30/17 0819  Last data filed at 10/30/17 0600   Gross per 24 hour   Intake          2516.88 ml   Output              580 ml   Net          1936.88 ml       EXAM:  General appearance: Mechanical ventilation and sedated. Acutely on chronically ill appearing. Cachetic  HEENT: Normal cephalic, atraumatic without obvious deformity. Pupils equal, round, and reactive to light. Conjunctivae/corneas clear. Neck: Supple, with full range of motion. No jugular venous distention. Trachea midline. No masses palpated. ET tube in place. Respiratory: On mechanical ventilation, coarse bilaterally, no wheezes noted. Diminished bilateral bases. Cardiovascular: RRR with no discernible murmurs, rubs or gallops noted, heart tones distant. Abdomen: Soft, non-distended with normal bowel sounds. No hepatosplenomegaly.     Musculoskeletal: No clubbing, cyanosis or edema bilaterally. Full range of motion without deformity. Skin: Skin very dry, cool and clammy. Turgor poor. Neurologic:  Unable to assess. Does move extremities in bed, no facial droop noted. Psychiatric: Sedated. Medications:  Scheduled Meds:   heparin (porcine)  5,000 Units Subcutaneous 3 times per day    chlorhexidine  15 mL Mouth/Throat BID    pantoprazole  40 mg Intravenous Daily    And    sodium chloride (PF)  10 mL Intravenous Daily    meropenem  0.5 g Intravenous Q12H    And    sterile water  10 mL Injection Q12H    vancomycin (VANCOCIN) intermittent dosing (placeholder)   Other RX Placeholder       PRN Meds:  albuterol sulfate HFA, fentanNYL, LORazepam    Results:  CBC:   Recent Labs      10/28/17   1130  10/29/17   0025  10/30/17   0400   WBC  13.6*  12.8*  9.9   HGB  14.0  10.4*  9.8*   HCT  48.4*  33.6*  31.7*   MCV  90.0  85.1  86.8   PLT  221  177  193     BMP:   Recent Labs      10/28/17   1115   10/29/17   0025  10/29/17   0433  10/30/17   0400  10/30/17   0431   NA  145   --   148*   --   150*   --    K  5.0   < >  4.3  4.2  3.9  3.8   CL  100   --   108   --   115*   --    CO2  34*   --   32*   --   27   --    BUN  60*   --   58*   --   51*   --    CREATININE  3.0*   --   2.7*   --   1.8*   --     < > = values in this interval not displayed.      LIVER PROFILE:   Recent Labs      10/28/17   1115  10/30/17   0400   AST  18  22   ALT  9  15   BILITOT  0.4  0.4   ALKPHOS  106*  60     PT/INR:   Recent Labs      10/28/17   1115   PROTIME  16.9*   INR  1.37*     APTT:   Recent Labs      10/28/17   1115   APTT  31.8     UA:  Recent Labs      10/28/17   1255   COLORU  Yellow   PHUR  5.0   WBCUA  TNTC*   RBCUA  6-10*   BACTERIA  4+   CLARITYU  CLOUDY*   SPECGRAV  >=1.030   LEUKOCYTESUR  LARGE*   UROBILINOGEN  1.0   BILIRUBINUR  SMALL*   BLOODU  MODERATE*   GLUCOSEU  Negative   AMORPHOUS  2+*       Cultures:  Urine Culture [[id number]] (Abnormal)  Collected: 10/28/17 1255   Updated: 10/30/17 1262    Specimen Source: Urine, clean catch     Urine Culture, Routine >100,000 CFU/ml (A)    Organism Escherichia coli (A)                Films:  CXR 10/30/17:  Impression:     1. No change in bilateral infiltrates that may represent pulmonary  edema or pneumonia. 2. Small pleural effusions bilaterally. 3. Cardiomegaly. CXR 10/29/17:  Impression:     1. Well-positioned nasogastric tube. 2. Similar appearance of right basilar infiltrate. CXR 10/28/17:  Impression:     1. Well-positioned left jugular central line. 2. No pneumothorax. CXR 10/28/17:  Impression:     1. Well-positioned endotracheal tube. 2. Findings suspicious for developing right perihilar infiltrate. CT Head:  Impression:     1. No acute intracranial abnormality is seen. Assessment:  Active Hospital Problems    Diagnosis Date Noted    Septic shock ([facility] [A41.9, R65.21] 10/28/2017    Acute cystitis without hematuria [N30.00] 10/28/2017    HCAP (healthcare-associated pneumonia) [J18.9] 10/28/2017    Late onset Alzheimer's disease with behavioral disturbance [G30.1, F02.81] 10/28/2017    TU (acute kidney injury) ([facility] [N17.9] 10/28/2017    Acute respiratory failure with hypoxia ([facility] [J96.01] 97/12/7481    Metabolic acidosis [X80.6] 10/28/2017           Plan:  No Vent changes today. Continue merrem and vancomycin for now. Sputum culture pending. Continue routine vent orders. DC IVF at 100 cc/hr, D5W at 50 cc/hr. Repeat labs in am.  Prognosis remains poor, condition critical.  Son present at this time, discussed situation again with him. See all orders. Further recommendations per clinical course.     Critical Care time:  40 minutes      Jorden Styles DO

## 2017-10-31 NOTE — PROGRESS NOTES
No progress toward goals   · Goals: Meet nutritional needs via RADHA or diet advancement. · Monitoring: NPO Status, Diet Progression, TF Intake, Skin Integrity, Fluid Balance, Weight, Pertinent Labs    See Adult Nutrition Doc Flowsheet for more detail.      Electronically signed by Jae Cordero MS, RD, LD on 10/31/17 at 10:15 AM    Contact Number: [phone]

## 2017-10-31 NOTE — PROGRESS NOTES
Pharmacy Vancomycin Consult     Vancomycin Day: 4  Current Dosing: pulse dosing    Temp max:  99.8    Recent Labs      10/30/17   0400  10/31/17   0130   BUN  51*  37*       Recent Labs      10/30/17   0400  10/31/17   0130   CREATININE  1.8*  1.5*       Recent Labs      10/30/17   0400  10/31/17   0143   WBC  9.9  8.4         Intake/Output Summary (Last 24 hours) at 10/31/17 0357  Last data filed at 10/31/17 0200   Gross per 24 hour   Intake 2358.46 ml   Output 820 ml   Net 1538.46 ml       Culture Date Source Results   10/28/17 Blood No growth   10/28/17 Urine E coli       Ht Readings from Last 1 Encounters:   10/30/17 5' 7\" (1.702 m)        Wt Readings from Last 1 Encounters:   10/30/17 146 lb (66.2 kg)         Body mass index is 22.87 kg/m². Estimated Creatinine Clearance: 27 mL/min (based on SCr of 1.5 mg/dL). Random: 11.2    Assessment/Plan: Will give vancomycin 1000 mg IV x 1. Random level ordered.      Electronically signed by ABI Rodas [facility] on 10/31/2017 at 3:57 AM

## 2017-10-31 NOTE — PROGRESS NOTES
Follow up visit:  Pt remains sedated and on vent. Soft restraints in place to keep pt from pulling at lines and tube. No family present at this time. PC will follow for support and goals of care.     Electronically signed by Andressa Willson RN on 10/31/2017 at 2:06 PM

## 2017-10-31 NOTE — PROGRESS NOTES
Spoke with pt's brother Rikki Villalobos. He stated they were waiting to se how she did once the sedation was turned down and the vent turned down to discuss what steps to take next. Pt sedated and resting on vent. Spo2 89-92% which is lower than throughout the day at 96-97%. VSS. Cl yellow urine to F/C. Electronically signed by Queen Laina RN on 10/31/2017 at 5:33 PM

## 2017-11-01 NOTE — PROGRESS NOTES
11/1/2017  4:39 AM - Jackie Maya Incoming Lab Results From [facility]     Component Results     Component Value Ref Range & Units Status Collected Lab   pH, Arterial 7.450  7.350 - 7.450 Final 11/01/2017  4:38 AM [facility]   pCO2, Arterial 40.0  35.0 - 45.0 mmHg Final 11/01/2017  4:38 AM [facility]   pO2, Arterial 70.0   80.0 - 100.0 mmHg Final 11/01/2017  4:38 AM [facility]   HCO3, Arterial 27.8   22.0 - 26.0 mmol/L Final 11/01/2017  4:38 AM [facility] Excess, Arterial 3.5   -2.0 - 2.0 mmol/L Final 11/01/2017  4:38 AM [facility]   Hemoglobin, Art, Extended 11.5   12.0 - 16.0 g/dL Final 11/01/2017  4:38 AM [facility]   O2 Sat, Arterial 93.5  >92 % Final 11/01/2017  4:38 AM [facility]   Carboxyhgb, Arterial 2.1  0.0 - 5.0 % Final 11/01/2017  4:38 AM [facility]        0.0-1.5   (Smokers 1.5-5.0)    Methemoglobin, Arterial 1.4  <1.5 % Final 11/01/2017  4:38 AM [facility]   O2 Content, Arterial 15.2  Not Established mL/dL Final 11/01/2017  4:38 AM [facility]   O2 Therapy Unknown          Right radial  AL+  SIMV 10 400 +5 peep, 40%

## 2017-11-01 NOTE — PROGRESS NOTES
Pt taken to hospice care center on ventilator. All family with patient.  Propofol still at 30 mcg/kg/min

## 2017-11-01 NOTE — PROGRESS NOTES
Nutrition Assessment    Type and Reason for Visit: Reassess    Nutrition Recommendations: start RADHA or whenextubated start po    Malnutrition Assessment:  · Malnutrition Status: At risk for malnutrition  · Context: Acute illness or injury  · Findings of the 6 clinical characteristics of malnutrition (Minimum of 2 out of 6 clinical characteristics is required to make the diagnosis of moderate or severe Protein Calorie Malnutrition based on AND/ASPEN Guidelines):  1. Energy Intake-Less than or equal to 50%, not able to assess    2. Weight Loss-Unable to assess, unable to assess  3. Fat Loss-No significant subcutaneous fat loss,    4. Muscle Loss-No significant muscle mass loss,    5. Fluid Accumulation-Mild fluid accumulation,    6.  Strength-Not measured    Nutrition Diagnosis:   · Problem: Inadequate oral intake  · Etiology: related to Impaired respiratory function-inability to consume food     Signs and symptoms:  as evidenced by NPO status due to medical condition, Intubation    Nutrition Assessment:  · Subjective Assessment: Pt remains on vent and NPO.   Propofol now 5.6ml  · Nutrition-Focused Physical Findings: on vent  · Wound Type: None (redness, bruising)  · Current Nutrition Therapies:  · Oral Diet Orders: NPO   · Oral Diet intake: NPO  · Oral Nutrition Supplement (ONS) Orders: None  · ONS intake: NPO   \  · Anthropometric Measures:  · Ht: 5' 7\" (170.2 cm)   · Current Body Wt: 148 lb 9 oz (67.4 kg)  · Admission Body Wt: 120 lb (54.4 kg)  · Ideal Body Wt: 135 lb (61.2 kg),   · BMI Classification: BMI 18.5 - 24.9 Normal Weight     · Comparative Standards (Estimated Nutrition Needs):  · Estimated Daily Total Kcal: 1734-0811  · Estimated Daily Protein (g): 53-66    Estimated Intake vs Estimated Needs: Intake Less Than Needs    Nutrition Risk Level: High    Nutrition Interventions:   Continue NPO  Continued Inpatient Monitoring    Nutrition Evaluation:   · Evaluation: No progress toward goals   · Goals: Meet nutritional needs via RADHA or diet advancement. · Monitoring: NPO Status, Skin Integrity, Weight, Pertinent Labs    See Adult Nutrition Doc Flowsheet for more detail.      Electronically signed by Pradeep Orona MS, RD, LD on 11/1/17 at 9:37 AM    Contact Number: [phone]

## 2017-11-01 NOTE — PROGRESS NOTES
Follow up visit:  Pt remains sedated and on the ventilator. Attempts to wean this morning were unsuccessful. Pt's brother at bedside and we discussed options of care. Explained hospice and the [facility] for end of life services and planned extubations. He stated the pt has not had good quality of life and been in the facility x 5 years. He is agreeable to hospice services, but will not go against the wishes of other family members. His Aunt will be here later today an we will have further discussion about goals of care and possible hospice. Offered emotional support and will follow POC.     Electronically signed by Abraham Hughes RN on 11/1/2017 at 10:20 AM

## 2017-11-01 NOTE — DISCHARGE SUMMARY
Discharge summary    Admission date: 10/28/17    Discharge date: 11/1/17    Admitting diagnosis: Acute hypoxemic respiratory failure, healthcare associated pneumonia    Discharge diagnoses: Acute hypoxemic respiratory failure, healthcare associated pneumonia, chronic dementia, urinary tract infection, acute kidney injury    History: The patient is an 80-year-old female who sent to the emergency room from the nursing home with respiratory distress. Patient was found have evidence of pneumonia with associated acute hypoxemic respiratory failure and the patient was intubated and admitted to ICU on ventilator. Getting history and physical for further details. Hospital course: Patient was treated with appropriate empiric antibiotics. She seemed to improve and attempts were made at weaning ventilator with spontaneous breathing trials but the patient showed no signs of being able to be extubated. Palliative care was consulted and discussed goals with family. The family feels the patient's quality of life recently has been quite poor and they have made the decision to withdrawal life prolonging therapy and the patient is being discharged to inpatient hospice unit. Laboratories: As above. Also documented in the chart. Disposition: Patient is discharged to inpatient hospice unit for comfort care. Activity as tolerated. Diet as tolerated. Discharge medications include fentanyl and Ativan and are detailed in the discharge medication reconciliation form.

## 2017-11-01 NOTE — PROGRESS NOTES
11/1/17  0750     Hospitalist Progress Note     S: Intubated and sedated.     ROS: Unable to obtain due to sedation and intubation     Labs reviewed, BUN/Cr=23/1.2     CXR-no change in B/l infiltrates     Pe:  VS's reviewed, dh=719/49   General: sedated, in no distress  Pulmonary: lungs clear (supine), nonlabored breathing on vent  Cardiovascular: heart regular, no murmurs, no lower extremity edema, R hand puffy  Gastrointestinal: abdomen is soft, nondistended, seems nontender, without masses  Neurologic: No respnse to voice. Skin: warm and dry, no significant lesions     A/P  1- Acute hypoxic/hypercapnic respiratory failure- Due to HCAP. Continues on vent support. SBT today.     2- Healthcare assocoiated pneumonia- improved,         Da y# 4 vancomycin and Merrem.     3-Acute kidney injury- improving     4- Hypernatremia- Resolved     5- Chr dementia     6- UTI-e coli on C&S.  Day # 4 Merrem

## 2017-11-01 NOTE — PROGRESS NOTES
Pharmacy Renal Adjustment    Graham Corona is a 80 y.o. female. Pharmacy has renally adjusted medications per protocol. Recent Labs      10/31/17   0130  11/01/17   0245   BUN  37*  23       Recent Labs      10/31/17   0130  11/01/17   0245   CREATININE  1.5*  1.2*       Estimated Creatinine Clearance: 34 mL/min (based on SCr of 1.2 mg/dL). Height:   Ht Readings from Last 1 Encounters:   10/30/17 5' 7\" (1.702 m)     Weight:  Wt Readings from Last 1 Encounters:   11/01/17 148 lb 9.6 oz (67.4 kg)         Plan: Adjust the following medications based on renal function:  Change Merrem to 1gm IV q 12 hours.     ARJUN ROE, PHARM D, 11/1/2017, 4:18 PM

## 2017-11-01 NOTE — PROGRESS NOTES
Palliative Care  made a follow up visit to offer support to Patient. Patient was unresponsive with Family present in the room with her. Family has reported that Patient is not improving and will be going to [facility].. Family says that they are at peace with the inevitable and thanked  for checking up on them.  will follow up in [facility]..       Electronically signed by Tom Miller on 11/1/2017 at 4:05 PM

## 2017-11-01 NOTE — PLAN OF CARE
Problem: Nutrition  Goal: Optimal nutrition therapy  Nutrition Problem: Inadequate oral intake  Intervention: Food and/or Nutrient Delivery: Continue NPO  Nutritional Goals: Meet nutritional needs via RADHA or diet advancement.       Outcome: Ongoing

## 2017-11-03 LAB
EKG P AXIS: 43 DEGREES
EKG P-R INTERVAL: 118 MS
EKG Q-T INTERVAL: 318 MS
EKG QRS DURATION: 120 MS
EKG QTC CALCULATION (BAZETT): 428 MS
EKG T AXIS: -75 DEGREES
